# Patient Record
Sex: FEMALE | Race: WHITE | NOT HISPANIC OR LATINO
[De-identification: names, ages, dates, MRNs, and addresses within clinical notes are randomized per-mention and may not be internally consistent; named-entity substitution may affect disease eponyms.]

---

## 2018-07-05 ENCOUNTER — RECORD ABSTRACTING (OUTPATIENT)
Age: 53
End: 2018-07-05

## 2018-07-05 ENCOUNTER — APPOINTMENT (OUTPATIENT)
Dept: FAMILY MEDICINE | Facility: CLINIC | Age: 53
End: 2018-07-05
Payer: COMMERCIAL

## 2018-07-05 LAB
BILIRUB UR QL STRIP: NORMAL
GLUCOSE UR-MCNC: NORMAL
HCG UR QL: 0.2 EU/DL
HGB UR QL STRIP.AUTO: NORMAL
KETONES UR-MCNC: NORMAL
LEUKOCYTE ESTERASE UR QL STRIP: NORMAL
NITRITE UR QL STRIP: NORMAL
PH UR STRIP: 5
PROT UR STRIP-MCNC: NORMAL
SP GR UR STRIP: 1.01

## 2018-07-05 PROCEDURE — 99396 PREV VISIT EST AGE 40-64: CPT | Mod: 25

## 2018-07-05 PROCEDURE — 81003 URINALYSIS AUTO W/O SCOPE: CPT | Mod: QW

## 2018-07-05 PROCEDURE — 36415 COLL VENOUS BLD VENIPUNCTURE: CPT

## 2018-07-06 LAB
25(OH)D3 SERPL-MCNC: 32.8 NG/ML
ALBUMIN SERPL ELPH-MCNC: 4.6 G/DL
ALP BLD-CCNC: 53 U/L
ALT SERPL-CCNC: 13 U/L
ANION GAP SERPL CALC-SCNC: 16 MMOL/L
AST SERPL-CCNC: 18 U/L
BASOPHILS # BLD AUTO: 0.01 K/UL
BASOPHILS NFR BLD AUTO: 0.3 %
BILIRUB SERPL-MCNC: 0.3 MG/DL
BUN SERPL-MCNC: 16 MG/DL
CALCIUM SERPL-MCNC: 9.5 MG/DL
CANCER AG125 SERPL-ACNC: 7 U/ML
CANCER AG27-29 SERPL-ACNC: 23.2 U/ML
CHLORIDE SERPL-SCNC: 104 MMOL/L
CHOLEST SERPL-MCNC: 210 MG/DL
CHOLEST/HDLC SERPL: 3 RATIO
CO2 SERPL-SCNC: 25 MMOL/L
CREAT SERPL-MCNC: 0.85 MG/DL
EOSINOPHIL # BLD AUTO: 0.03 K/UL
EOSINOPHIL NFR BLD AUTO: 0.9 %
GLUCOSE SERPL-MCNC: 95 MG/DL
HCT VFR BLD CALC: 44.3 %
HDLC SERPL-MCNC: 70 MG/DL
HGB BLD-MCNC: 14.5 G/DL
IMM GRANULOCYTES NFR BLD AUTO: 0 %
LDLC SERPL CALC-MCNC: 105 MG/DL
LYMPHOCYTES # BLD AUTO: 1.68 K/UL
LYMPHOCYTES NFR BLD AUTO: 48.4 %
MAN DIFF?: NORMAL
MCHC RBC-ENTMCNC: 32.1 PG
MCHC RBC-ENTMCNC: 32.7 GM/DL
MCV RBC AUTO: 98 FL
MONOCYTES # BLD AUTO: 0.16 K/UL
MONOCYTES NFR BLD AUTO: 4.6 %
NEUTROPHILS # BLD AUTO: 1.59 K/UL
NEUTROPHILS NFR BLD AUTO: 45.8 %
PLATELET # BLD AUTO: 198 K/UL
POTASSIUM SERPL-SCNC: 4.3 MMOL/L
PROT SERPL-MCNC: 6.5 G/DL
RBC # BLD: 4.52 M/UL
RBC # FLD: 13.5 %
SODIUM SERPL-SCNC: 145 MMOL/L
TRIGL SERPL-MCNC: 177 MG/DL
WBC # FLD AUTO: 3.47 K/UL

## 2018-07-06 NOTE — HISTORY OF PRESENT ILLNESS
[FreeTextEntry1] : No complaints [de-identified] : Feel well. 10 year STANISLAV. Followed by Sukhdev

## 2018-07-06 NOTE — PHYSICAL EXAM

## 2018-11-03 ENCOUNTER — TRANSCRIPTION ENCOUNTER (OUTPATIENT)
Age: 53
End: 2018-11-03

## 2019-01-16 ENCOUNTER — TRANSCRIPTION ENCOUNTER (OUTPATIENT)
Age: 54
End: 2019-01-16

## 2019-09-24 ENCOUNTER — APPOINTMENT (OUTPATIENT)
Dept: FAMILY MEDICINE | Facility: CLINIC | Age: 54
End: 2019-09-24
Payer: COMMERCIAL

## 2019-09-24 VITALS
BODY MASS INDEX: 20.91 KG/M2 | DIASTOLIC BLOOD PRESSURE: 70 MMHG | HEIGHT: 65.5 IN | SYSTOLIC BLOOD PRESSURE: 108 MMHG | WEIGHT: 127 LBS

## 2019-09-24 DIAGNOSIS — N91.2 AMENORRHEA, UNSPECIFIED: ICD-10-CM

## 2019-09-24 PROCEDURE — 99213 OFFICE O/P EST LOW 20 MIN: CPT | Mod: 25

## 2019-09-24 NOTE — PHYSICAL EXAM
[Normal Sclera/Conjunctiva] : normal sclera/conjunctiva [Normal] : no acute distress, well nourished, well developed and well-appearing [No Respiratory Distress] : no respiratory distress  [Normal Outer Ear/Nose] : the outer ears and nose were normal in appearance [No Accessory Muscle Use] : no accessory muscle use [Vagina] : normal vaginal exam [Urethral Meatus] : the meatus of the urethra showed no abnormalities [External Female Genitalia] : normal external genitalia [Cervix] : normal cervix

## 2019-09-24 NOTE — HISTORY OF PRESENT ILLNESS
[FreeTextEntry1] : patient is here for a women's wellness exam  [de-identified] : Azucena is a 54-year old female with a h/o breast cancer s/p right mastectomy 12 years ago who presents for a GYN exam.\par

## 2019-10-01 LAB
CYTOLOGY CVX/VAG DOC THIN PREP: ABNORMAL
HEMOCCULT STL QL IA: NEGATIVE
HPV DNA HIGH RISK: NEGATIVE
HPV DNA LOW RISK: NEGATIVE

## 2019-10-21 ENCOUNTER — NON-APPOINTMENT (OUTPATIENT)
Age: 54
End: 2019-10-21

## 2019-10-21 ENCOUNTER — APPOINTMENT (OUTPATIENT)
Dept: FAMILY MEDICINE | Facility: CLINIC | Age: 54
End: 2019-10-21
Payer: COMMERCIAL

## 2019-10-21 VITALS
DIASTOLIC BLOOD PRESSURE: 84 MMHG | WEIGHT: 128 LBS | HEART RATE: 60 BPM | OXYGEN SATURATION: 98 % | SYSTOLIC BLOOD PRESSURE: 120 MMHG | TEMPERATURE: 98.3 F | HEIGHT: 65.5 IN | BODY MASS INDEX: 21.07 KG/M2

## 2019-10-21 LAB
BILIRUB UR QL STRIP: NEGATIVE
CLARITY UR: CLEAR
COLLECTION METHOD: NORMAL
GLUCOSE UR-MCNC: NEGATIVE
HCG UR QL: 0.2 EU/DL
HGB UR QL STRIP.AUTO: NEGATIVE
KETONES UR-MCNC: NEGATIVE
LEUKOCYTE ESTERASE UR QL STRIP: NORMAL
NITRITE UR QL STRIP: NEGATIVE
PH UR STRIP: 5.5
PROT UR STRIP-MCNC: NEGATIVE
SP GR UR STRIP: 1.02

## 2019-10-21 PROCEDURE — 81003 URINALYSIS AUTO W/O SCOPE: CPT | Mod: QW

## 2019-10-21 PROCEDURE — 99396 PREV VISIT EST AGE 40-64: CPT | Mod: 25

## 2019-10-21 PROCEDURE — 36415 COLL VENOUS BLD VENIPUNCTURE: CPT

## 2019-10-21 PROCEDURE — 93000 ELECTROCARDIOGRAM COMPLETE: CPT

## 2019-10-24 NOTE — HEALTH RISK ASSESSMENT
[Very Good] : ~his/her~  mood as very good [] : No [No] : In the past 12 months have you used drugs other than those required for medical reasons? No [No falls in past year] : Patient reported no falls in the past year [0] : 1) Little interest or pleasure doing things: Not at all (0) [[id number]] : 0 [Fully functional (bathing, dressing, toileting, transferring, walking, feeding)] : Fully functional (bathing, dressing, toileting, transferring, walking, feeding) [Change in mental status noted] : No change in mental status noted [Fully functional (using the telephone, shopping, preparing meals, housekeeping, doing laundry, using] : Fully functional and needs no help or supervision to perform IADLs (using the telephone, shopping, preparing meals, housekeeping, doing laundry, using transportation, managing medications and managing finances)

## 2019-10-24 NOTE — HISTORY OF PRESENT ILLNESS
[de-identified] : Feel well. 10 year STANISLAV. Followed by Sukhdev [FreeTextEntry1] : patient is here for a CPE

## 2019-10-24 NOTE — PHYSICAL EXAM
[No Acute Distress] : no acute distress [Well Developed] : well developed [Well Nourished] : well nourished [Well-Appearing] : well-appearing [Normal Sclera/Conjunctiva] : normal sclera/conjunctiva [PERRL] : pupils equal round and reactive to light [EOMI] : extraocular movements intact [Normal Outer Ear/Nose] : the outer ears and nose were normal in appearance [No JVD] : no jugular venous distention [Normal Oropharynx] : the oropharynx was normal [No Lymphadenopathy] : no lymphadenopathy [Supple] : supple [Thyroid Normal, No Nodules] : the thyroid was normal and there were no nodules present [No Respiratory Distress] : no respiratory distress  [No Accessory Muscle Use] : no accessory muscle use [Clear to Auscultation] : lungs were clear to auscultation bilaterally [Normal Rate] : normal rate  [Regular Rhythm] : with a regular rhythm [No Murmur] : no murmur heard [Normal S1, S2] : normal S1 and S2 [No Carotid Bruits] : no carotid bruits [No Abdominal Bruit] : a ~M bruit was not heard ~T in the abdomen [No Varicosities] : no varicosities [Pedal Pulses Present] : the pedal pulses are present [No Edema] : there was no peripheral edema [No Palpable Aorta] : no palpable aorta [No Extremity Clubbing/Cyanosis] : no extremity clubbing/cyanosis [Non-distended] : non-distended [Soft] : abdomen soft [Non Tender] : non-tender [No Masses] : no abdominal mass palpated [No HSM] : no HSM [Normal Bowel Sounds] : normal bowel sounds [Normal Posterior Cervical Nodes] : no posterior cervical lymphadenopathy [Normal Anterior Cervical Nodes] : no anterior cervical lymphadenopathy [No CVA Tenderness] : no CVA  tenderness [No Spinal Tenderness] : no spinal tenderness [No Joint Swelling] : no joint swelling [Grossly Normal Strength/Tone] : grossly normal strength/tone [No Rash] : no rash [Coordination Grossly Intact] : coordination grossly intact [No Focal Deficits] : no focal deficits [Deep Tendon Reflexes (DTR)] : deep tendon reflexes were 2+ and symmetric [Normal Gait] : normal gait [Normal Affect] : the affect was normal [Normal Insight/Judgement] : insight and judgment were intact

## 2019-11-08 LAB
ALBUMIN SERPL ELPH-MCNC: 4.7 G/DL
ALP BLD-CCNC: 56 U/L
ALT SERPL-CCNC: 10 U/L
ANION GAP SERPL CALC-SCNC: 14 MMOL/L
AST SERPL-CCNC: 15 U/L
BASOPHILS # BLD AUTO: 0.01 K/UL
BASOPHILS NFR BLD AUTO: 0.3 %
BILIRUB SERPL-MCNC: 0.3 MG/DL
BUN SERPL-MCNC: 12 MG/DL
CALCIUM SERPL-MCNC: 10.2 MG/DL
CHLORIDE SERPL-SCNC: 105 MMOL/L
CHOLEST SERPL-MCNC: 188 MG/DL
CHOLEST/HDLC SERPL: 2.9 RATIO
CO2 SERPL-SCNC: 27 MMOL/L
CREAT SERPL-MCNC: 0.76 MG/DL
EOSINOPHIL # BLD AUTO: 0.03 K/UL
EOSINOPHIL NFR BLD AUTO: 0.8 %
GLUCOSE SERPL-MCNC: 91 MG/DL
HCT VFR BLD CALC: 42.7 %
HDLC SERPL-MCNC: 66 MG/DL
HGB BLD-MCNC: 13.4 G/DL
IMM GRANULOCYTES NFR BLD AUTO: 0.3 %
LDLC SERPL CALC-MCNC: 85 MG/DL
LYMPHOCYTES # BLD AUTO: 1.76 K/UL
LYMPHOCYTES NFR BLD AUTO: 46.1 %
MAN DIFF?: NORMAL
MCHC RBC-ENTMCNC: 31.2 PG
MCHC RBC-ENTMCNC: 31.4 GM/DL
MCV RBC AUTO: 99.3 FL
MONOCYTES # BLD AUTO: 0.27 K/UL
MONOCYTES NFR BLD AUTO: 7.1 %
NEUTROPHILS # BLD AUTO: 1.74 K/UL
NEUTROPHILS NFR BLD AUTO: 45.4 %
PLATELET # BLD AUTO: 205 K/UL
POTASSIUM SERPL-SCNC: 5.2 MMOL/L
PROT SERPL-MCNC: 6.4 G/DL
RBC # BLD: 4.3 M/UL
RBC # FLD: 12.2 %
SODIUM SERPL-SCNC: 146 MMOL/L
TRIGL SERPL-MCNC: 186 MG/DL
WBC # FLD AUTO: 3.82 K/UL

## 2020-11-19 ENCOUNTER — APPOINTMENT (OUTPATIENT)
Dept: FAMILY MEDICINE | Facility: CLINIC | Age: 55
End: 2020-11-19
Payer: COMMERCIAL

## 2020-11-19 ENCOUNTER — NON-APPOINTMENT (OUTPATIENT)
Age: 55
End: 2020-11-19

## 2020-11-19 VITALS
HEIGHT: 65.5 IN | HEART RATE: 53 BPM | WEIGHT: 124 LBS | BODY MASS INDEX: 20.41 KG/M2 | DIASTOLIC BLOOD PRESSURE: 76 MMHG | TEMPERATURE: 97.1 F | SYSTOLIC BLOOD PRESSURE: 118 MMHG | OXYGEN SATURATION: 99 %

## 2020-11-19 LAB
BILIRUB UR QL STRIP: NORMAL
CLARITY UR: CLEAR
COLLECTION METHOD: NORMAL
GLUCOSE UR-MCNC: NORMAL
HCG UR QL: 0.2 EU/DL
HGB UR QL STRIP.AUTO: NORMAL
KETONES UR-MCNC: NORMAL
LEUKOCYTE ESTERASE UR QL STRIP: NORMAL
NITRITE UR QL STRIP: NORMAL
PH UR STRIP: 5.5
PROT UR STRIP-MCNC: NORMAL
SP GR UR STRIP: >=1.03

## 2020-11-19 PROCEDURE — 93000 ELECTROCARDIOGRAM COMPLETE: CPT

## 2020-11-19 PROCEDURE — 99396 PREV VISIT EST AGE 40-64: CPT | Mod: 25

## 2020-11-19 PROCEDURE — 36415 COLL VENOUS BLD VENIPUNCTURE: CPT

## 2020-11-19 PROCEDURE — 81003 URINALYSIS AUTO W/O SCOPE: CPT | Mod: QW

## 2020-11-19 NOTE — HISTORY OF PRESENT ILLNESS
[FreeTextEntry1] : none [de-identified] : Feel well. 10 year STANISLAV. Followed by Sukhdev\par Will be having one implant changed

## 2020-11-19 NOTE — HEALTH RISK ASSESSMENT
[Very Good] : ~his/her~  mood as very good [No] : In the past 12 months have you used drugs other than those required for medical reasons? No [No falls in past year] : Patient reported no falls in the past year [0] : 2) Feeling down, depressed, or hopeless: Not at all (0) [Fully functional (bathing, dressing, toileting, transferring, walking, feeding)] : Fully functional (bathing, dressing, toileting, transferring, walking, feeding) [Fully functional (using the telephone, shopping, preparing meals, housekeeping, doing laundry, using] : Fully functional and needs no help or supervision to perform IADLs (using the telephone, shopping, preparing meals, housekeeping, doing laundry, using transportation, managing medications and managing finances) [] : No

## 2021-01-12 LAB
25(OH)D3 SERPL-MCNC: 28.6 NG/ML
ALBUMIN SERPL ELPH-MCNC: 4.8 G/DL
ALP BLD-CCNC: 53 U/L
ALT SERPL-CCNC: 15 U/L
ANION GAP SERPL CALC-SCNC: 11 MMOL/L
AST SERPL-CCNC: 17 U/L
BASOPHILS # BLD AUTO: 0.02 K/UL
BASOPHILS NFR BLD AUTO: 0.7 %
BILIRUB SERPL-MCNC: 0.5 MG/DL
BUN SERPL-MCNC: 13 MG/DL
CALCIUM SERPL-MCNC: 9.7 MG/DL
CHLORIDE SERPL-SCNC: 104 MMOL/L
CHOLEST SERPL-MCNC: 206 MG/DL
CO2 SERPL-SCNC: 29 MMOL/L
CREAT SERPL-MCNC: 0.87 MG/DL
EOSINOPHIL # BLD AUTO: 0.02 K/UL
EOSINOPHIL NFR BLD AUTO: 0.7 %
ESTIMATED AVERAGE GLUCOSE: 94 MG/DL
GLUCOSE SERPL-MCNC: 80 MG/DL
HBA1C MFR BLD HPLC: 4.9 %
HCT VFR BLD CALC: 46.6 %
HDLC SERPL-MCNC: 69 MG/DL
HGB BLD-MCNC: 14.7 G/DL
IMM GRANULOCYTES NFR BLD AUTO: 0 %
LDLC SERPL CALC-MCNC: 117 MG/DL
LYMPHOCYTES # BLD AUTO: 1.34 K/UL
LYMPHOCYTES NFR BLD AUTO: 45.1 %
MAN DIFF?: NORMAL
MCHC RBC-ENTMCNC: 31.5 GM/DL
MCHC RBC-ENTMCNC: 32 PG
MCV RBC AUTO: 101.5 FL
MONOCYTES # BLD AUTO: 0.15 K/UL
MONOCYTES NFR BLD AUTO: 5.1 %
NEUTROPHILS # BLD AUTO: 1.44 K/UL
NEUTROPHILS NFR BLD AUTO: 48.4 %
NONHDLC SERPL-MCNC: 137 MG/DL
PLATELET # BLD AUTO: 186 K/UL
POTASSIUM SERPL-SCNC: 4.2 MMOL/L
PROT SERPL-MCNC: 6.3 G/DL
RBC # BLD: 4.59 M/UL
RBC # FLD: 12.3 %
SODIUM SERPL-SCNC: 143 MMOL/L
TRIGL SERPL-MCNC: 98 MG/DL
WBC # FLD AUTO: 2.97 K/UL

## 2021-05-11 ENCOUNTER — APPOINTMENT (OUTPATIENT)
Dept: FAMILY MEDICINE | Facility: CLINIC | Age: 56
End: 2021-05-11
Payer: COMMERCIAL

## 2021-05-11 VITALS
HEIGHT: 65.5 IN | DIASTOLIC BLOOD PRESSURE: 72 MMHG | SYSTOLIC BLOOD PRESSURE: 120 MMHG | WEIGHT: 125 LBS | BODY MASS INDEX: 20.58 KG/M2 | OXYGEN SATURATION: 99 % | HEART RATE: 49 BPM

## 2021-05-11 DIAGNOSIS — L81.9 DISORDER OF PIGMENTATION, UNSPECIFIED: ICD-10-CM

## 2021-05-11 DIAGNOSIS — R21 RASH AND OTHER NONSPECIFIC SKIN ERUPTION: ICD-10-CM

## 2021-05-11 PROCEDURE — 99213 OFFICE O/P EST LOW 20 MIN: CPT

## 2021-05-11 PROCEDURE — 99072 ADDL SUPL MATRL&STAF TM PHE: CPT

## 2021-05-13 PROBLEM — L81.9 HYPERPIGMENTATION OF SKIN: Status: ACTIVE | Noted: 2021-05-13

## 2021-05-13 NOTE — HISTORY OF PRESENT ILLNESS
[FreeTextEntry1] : patient found a spot on her back, for about a month [de-identified] : as per cc

## 2021-05-13 NOTE — PHYSICAL EXAM
[Normal] : normal rate, regular rhythm, normal S1 and S2 and no murmur heard [de-identified] : 4x5 cm area of light hyperpigmentation

## 2021-09-30 ENCOUNTER — TRANSCRIPTION ENCOUNTER (OUTPATIENT)
Age: 56
End: 2021-09-30

## 2021-11-29 ENCOUNTER — APPOINTMENT (OUTPATIENT)
Dept: OBGYN | Facility: CLINIC | Age: 56
End: 2021-11-29

## 2021-12-20 ENCOUNTER — TRANSCRIPTION ENCOUNTER (OUTPATIENT)
Age: 56
End: 2021-12-20

## 2021-12-20 ENCOUNTER — APPOINTMENT (OUTPATIENT)
Dept: OBGYN | Facility: CLINIC | Age: 56
End: 2021-12-20
Payer: COMMERCIAL

## 2021-12-20 DIAGNOSIS — Z98.891 HISTORY OF UTERINE SCAR FROM PREVIOUS SURGERY: ICD-10-CM

## 2021-12-20 DIAGNOSIS — Z87.891 PERSONAL HISTORY OF NICOTINE DEPENDENCE: ICD-10-CM

## 2021-12-20 PROCEDURE — 99203 OFFICE O/P NEW LOW 30 MIN: CPT | Mod: 95

## 2021-12-20 NOTE — HISTORY OF PRESENT ILLNESS
[Patient reported PAP Smear was normal] : Patient reported PAP Smear was normal [Mammogramdate] : 2006 [TextBox_19] : MASTECTOMIES BILATERAL [PapSmeardate] : 2019 [TextBox_37] : NOT ON CHART [Yes] : Patient has concerns regarding sex [Currently Active] : currently active [Men] : men [No] : No [FreeTextEntry1] : VAGINAL DRYNESS, DISCUSSED OTC MANAGEMENT OF DRYNESS [FreeTextEntry2] :

## 2021-12-20 NOTE — PLAN
[FreeTextEntry1] : EX-SMOKER, FOR LOW DOSE CT CHEST, LUNG CANCER SCREENING.\par \par LAST PAP 2019, FOR F/U THIS YEAR.  \par \par CONSIDER COVID19  VACCINE, IN LIGHT OF PRIOR ILLNESS, NO VACCINATION.\par \par Discussed genitourinary syndrome of menopause include urinary and  vaginal symptoms of infection, inflammation, dyspareunia, incontinence and bleeding. Evaluation including cultures and possible sonogram broached.   Therapies including dietary intervention, antibiotics, local oil, estrogen or DHEA application, physical therapy and urogynecology referrals discussed.\par OTC NOGC-CARE DISCUSSED.

## 2022-01-10 ENCOUNTER — NON-APPOINTMENT (OUTPATIENT)
Age: 57
End: 2022-01-10

## 2022-01-10 ENCOUNTER — APPOINTMENT (OUTPATIENT)
Dept: FAMILY MEDICINE | Facility: CLINIC | Age: 57
End: 2022-01-10
Payer: COMMERCIAL

## 2022-01-10 VITALS
HEART RATE: 50 BPM | BODY MASS INDEX: 19.59 KG/M2 | DIASTOLIC BLOOD PRESSURE: 70 MMHG | TEMPERATURE: 96 F | SYSTOLIC BLOOD PRESSURE: 106 MMHG | WEIGHT: 119 LBS | OXYGEN SATURATION: 97 % | HEIGHT: 65.5 IN

## 2022-01-10 DIAGNOSIS — Z11.59 ENCOUNTER FOR SCREENING FOR OTHER VIRAL DISEASES: ICD-10-CM

## 2022-01-10 PROCEDURE — 81003 URINALYSIS AUTO W/O SCOPE: CPT | Mod: QW

## 2022-01-10 PROCEDURE — 99396 PREV VISIT EST AGE 40-64: CPT | Mod: 25

## 2022-01-10 PROCEDURE — 36415 COLL VENOUS BLD VENIPUNCTURE: CPT

## 2022-01-10 PROCEDURE — 93000 ELECTROCARDIOGRAM COMPLETE: CPT

## 2022-01-10 NOTE — HEALTH RISK ASSESSMENT
[Very Good] : ~his/her~  mood as very good [Never] : Never [No] : In the past 12 months have you used drugs other than those required for medical reasons? No [No falls in past year] : Patient reported no falls in the past year [0] : 2) Feeling down, depressed, or hopeless: Not at all (0) [PHQ-2 Negative - No further assessment needed] : PHQ-2 Negative - No further assessment needed [[id number]] : 0 [Change in mental status noted] : No change in mental status noted [Fully functional (bathing, dressing, toileting, transferring, walking, feeding)] : Fully functional (bathing, dressing, toileting, transferring, walking, feeding) [Fully functional (using the telephone, shopping, preparing meals, housekeeping, doing laundry, using] : Fully functional and needs no help or supervision to perform IADLs (using the telephone, shopping, preparing meals, housekeeping, doing laundry, using transportation, managing medications and managing finances)

## 2022-02-07 LAB
25(OH)D3 SERPL-MCNC: 28.6 NG/ML
ALBUMIN SERPL ELPH-MCNC: 5.1 G/DL
ALP BLD-CCNC: 53 U/L
ALT SERPL-CCNC: 15 U/L
ANION GAP SERPL CALC-SCNC: 13 MMOL/L
AST SERPL-CCNC: 19 U/L
BASOPHILS # BLD AUTO: 0.03 K/UL
BASOPHILS NFR BLD AUTO: 0.7 %
BILIRUB SERPL-MCNC: 0.5 MG/DL
BUN SERPL-MCNC: 15 MG/DL
CALCIUM SERPL-MCNC: 10 MG/DL
CHLORIDE SERPL-SCNC: 101 MMOL/L
CHOLEST SERPL-MCNC: 243 MG/DL
CO2 SERPL-SCNC: 28 MMOL/L
COVID-19 NUCLEOCAPSID  GAM ANTIBODY INTERPRETATION: NEGATIVE
CREAT SERPL-MCNC: 0.77 MG/DL
EOSINOPHIL # BLD AUTO: 0.03 K/UL
EOSINOPHIL NFR BLD AUTO: 0.7 %
ESTIMATED AVERAGE GLUCOSE: 97 MG/DL
GLUCOSE SERPL-MCNC: 83 MG/DL
HBA1C MFR BLD HPLC: 5 %
HCT VFR BLD CALC: 47.1 %
HDLC SERPL-MCNC: 88 MG/DL
HGB BLD-MCNC: 14.9 G/DL
IMM GRANULOCYTES NFR BLD AUTO: 0 %
LDLC SERPL CALC-MCNC: 128 MG/DL
LYMPHOCYTES # BLD AUTO: 2.01 K/UL
LYMPHOCYTES NFR BLD AUTO: 48 %
MAN DIFF?: NORMAL
MCHC RBC-ENTMCNC: 31.2 PG
MCHC RBC-ENTMCNC: 31.6 GM/DL
MCV RBC AUTO: 98.7 FL
MONOCYTES # BLD AUTO: 0.25 K/UL
MONOCYTES NFR BLD AUTO: 6 %
NEUTROPHILS # BLD AUTO: 1.87 K/UL
NEUTROPHILS NFR BLD AUTO: 44.6 %
NONHDLC SERPL-MCNC: 154 MG/DL
PLATELET # BLD AUTO: 207 K/UL
POTASSIUM SERPL-SCNC: 4.7 MMOL/L
PROT SERPL-MCNC: 6.9 G/DL
RBC # BLD: 4.77 M/UL
RBC # FLD: 12.1 %
SARS-COV-2 AB SERPL QL IA: 0.07 INDEX
SODIUM SERPL-SCNC: 141 MMOL/L
TRIGL SERPL-MCNC: 133 MG/DL
WBC # FLD AUTO: 4.19 K/UL

## 2022-02-19 LAB — HEMOCCULT STL QL IA: NEGATIVE

## 2022-06-16 ENCOUNTER — APPOINTMENT (OUTPATIENT)
Dept: OBGYN | Facility: CLINIC | Age: 57
End: 2022-06-16
Payer: COMMERCIAL

## 2022-06-16 VITALS
HEIGHT: 65.5 IN | BODY MASS INDEX: 19.59 KG/M2 | WEIGHT: 119 LBS | DIASTOLIC BLOOD PRESSURE: 64 MMHG | SYSTOLIC BLOOD PRESSURE: 106 MMHG

## 2022-06-16 DIAGNOSIS — Z12.4 ENCOUNTER FOR SCREENING FOR MALIGNANT NEOPLASM OF CERVIX: ICD-10-CM

## 2022-06-16 DIAGNOSIS — N95.2 POSTMENOPAUSAL ATROPHIC VAGINITIS: ICD-10-CM

## 2022-06-16 PROCEDURE — 99214 OFFICE O/P EST MOD 30 MIN: CPT

## 2022-06-16 RX ORDER — ESTRADIOL 0.1 MG/G
0.1 CREAM VAGINAL
Qty: 1 | Refills: 3 | Status: ACTIVE | COMMUNITY
Start: 2022-06-16 | End: 1900-01-01

## 2022-06-16 RX ORDER — ESTRADIOL 2 MG/1
2 RING VAGINAL
Qty: 1 | Refills: 3 | Status: ACTIVE | COMMUNITY
Start: 2022-06-16 | End: 1900-01-01

## 2022-06-16 NOTE — HISTORY OF PRESENT ILLNESS
[Mammogramdate] : 11/2020, LEFT [TextBox_19] : UNSURE IF DONE, USUALLY GOES TO [facility] [PapSmeardate] : 9/2019 [TextBox_31] : HPV NEGATIVE [BoneDensityDate] : NOT YET [ColonoscopyDate] : [facility]OGUARD [facility] 2 [Yes] : Patient has concerns regarding sex [Currently Active] : currently active [Men] : men [No] : No [FreeTextEntry1] : VAGINAL DRYNESS [FreeTextEntry2] :

## 2022-06-16 NOTE — PHYSICAL EXAM
[Appropriately responsive] : appropriately responsive [Alert] : alert [No Acute Distress] : no acute distress [Soft] : soft [Non-tender] : non-tender [Non-distended] : non-distended [No HSM] : No HSM [No Lesions] : no lesions [No Mass] : no mass [Oriented x3] : oriented x3 [FreeTextEntry6] : RIGHT BREAST S/P MASTECTOMY WITH IMPLANT, LEFT WELL HEALED REDUCTION [No Discharge] : no discharge [No Masses] : no breast masses were palpable [Vulvar Atrophy] : vulvar atrophy [Labia Majora] : normal [Labia Minora] : normal [Normal] : normal [Uterine Adnexae] : normal [No Tenderness] : no tenderness [Nl Sphincter Tone] : normal sphincter tone [FreeTextEntry9] : GUAIAC NEGATIVE

## 2022-06-16 NOTE — PLAN
[FreeTextEntry1] : GUSM, DIFFICULTY COMPLYING WITH LONG TERM SUPPOSITORY USE.  DISCUSSED RISKS AND BENEFITS OF TOPICAL E2 TO VULVA WITH LOW DOSE ESTRING TO FOLLOW.  \par \par BREAST SCREENING LEFT, ROR [facility] MAMMOGRAM 2021, RX FOR CURRENT YEAR.\par \par MENOPAUSAL, DEXA TO EVALUATE OSTEOPENIA.

## 2022-06-20 LAB
CYTOLOGY CVX/VAG DOC THIN PREP: ABNORMAL
HPV HIGH+LOW RISK DNA PNL CVX: NOT DETECTED

## 2022-07-13 ENCOUNTER — NON-APPOINTMENT (OUTPATIENT)
Age: 57
End: 2022-07-13

## 2022-10-06 ENCOUNTER — APPOINTMENT (OUTPATIENT)
Dept: OBGYN | Facility: CLINIC | Age: 57
End: 2022-10-06

## 2022-10-13 ENCOUNTER — APPOINTMENT (OUTPATIENT)
Dept: OBGYN | Facility: CLINIC | Age: 57
End: 2022-10-13

## 2022-10-13 VITALS
DIASTOLIC BLOOD PRESSURE: 68 MMHG | WEIGHT: 118 LBS | HEIGHT: 65.5 IN | SYSTOLIC BLOOD PRESSURE: 112 MMHG | BODY MASS INDEX: 19.42 KG/M2

## 2022-10-13 PROCEDURE — 99214 OFFICE O/P EST MOD 30 MIN: CPT

## 2022-10-13 NOTE — PLAN
[FreeTextEntry1] : OSTEOPOROSIS.  F/U VITAMIN D3 AND EXERCISE. Resistance bands with links to resistance band exercises given to patient to promote bone health  ([facility]).\par  HX OF BREAST CANCER, CONSIDER EVISTA.

## 2022-10-13 NOTE — HISTORY OF PRESENT ILLNESS
[FreeTextEntry1] : Results of bone density reviewed with patient.  We discussed diet, exercise, calcium, vitamin D, smoking cessation, family history.  Fall precautions given. Discussed anti-resorptive agents, hormones and hormone agonists and expectant management.\par NOT TAKING VITAMIN D REGULARLY,FOR F/U AFTER 2 MONTHS.\par Resistance bands with links to resistance band exercises given to patient to promote bone health  ([facility]).\par \par OSTOEPOROSIS AT SPINE, OSTEOPENIA AT HIP. [TextBox_4] : Bone density results [Mammogramdate] : 7/29/22 [TextBox_19] : br 2 [BreastSonogramDate] : 7/29/22 [TextBox_25] : br 1  left breast  [PapSmeardate] : 6/12//22 [TextBox_31] : atrophic

## 2022-11-22 ENCOUNTER — APPOINTMENT (OUTPATIENT)
Dept: OBGYN | Facility: CLINIC | Age: 57
End: 2022-11-22

## 2023-02-23 ENCOUNTER — APPOINTMENT (OUTPATIENT)
Dept: FAMILY MEDICINE | Facility: CLINIC | Age: 58
End: 2023-02-23
Payer: COMMERCIAL

## 2023-02-23 ENCOUNTER — NON-APPOINTMENT (OUTPATIENT)
Age: 58
End: 2023-02-23

## 2023-02-23 VITALS
SYSTOLIC BLOOD PRESSURE: 116 MMHG | BODY MASS INDEX: 20.25 KG/M2 | HEART RATE: 55 BPM | TEMPERATURE: 96.5 F | HEIGHT: 65.5 IN | OXYGEN SATURATION: 98 % | DIASTOLIC BLOOD PRESSURE: 70 MMHG | WEIGHT: 123 LBS

## 2023-02-23 DIAGNOSIS — M25.541 PAIN IN JOINTS OF RIGHT HAND: ICD-10-CM

## 2023-02-23 DIAGNOSIS — M25.542 PAIN IN JOINTS OF RIGHT HAND: ICD-10-CM

## 2023-02-23 DIAGNOSIS — Z87.891 PERSONAL HISTORY OF NICOTINE DEPENDENCE: ICD-10-CM

## 2023-02-23 DIAGNOSIS — Z12.11 ENCOUNTER FOR SCREENING FOR MALIGNANT NEOPLASM OF COLON: ICD-10-CM

## 2023-02-23 LAB
BILIRUB UR QL STRIP: NEGATIVE
GLUCOSE UR-MCNC: NEGATIVE
HCG UR QL: 0.2 EU/DL
HGB UR QL STRIP.AUTO: NEGATIVE
KETONES UR-MCNC: NEGATIVE
LEUKOCYTE ESTERASE UR QL STRIP: NEGATIVE
NITRITE UR QL STRIP: NEGATIVE
PH UR STRIP: 5
PROT UR STRIP-MCNC: NEGATIVE
SP GR UR STRIP: 1.02

## 2023-02-23 PROCEDURE — 99396 PREV VISIT EST AGE 40-64: CPT | Mod: 25

## 2023-02-23 PROCEDURE — 36415 COLL VENOUS BLD VENIPUNCTURE: CPT

## 2023-02-23 PROCEDURE — 81003 URINALYSIS AUTO W/O SCOPE: CPT | Mod: QW

## 2023-02-23 PROCEDURE — 93000 ELECTROCARDIOGRAM COMPLETE: CPT

## 2023-02-23 NOTE — HEALTH RISK ASSESSMENT
[Very Good] : ~his/her~  mood as very good [No] : In the past 12 months have you used drugs other than those required for medical reasons? No [No falls in past year] : Patient reported no falls in the past year [0] : 2) Feeling down, depressed, or hopeless: Not at all (0) [PHQ-2 Negative - No further assessment needed] : PHQ-2 Negative - No further assessment needed [[id number]] : 0 [Never] : Never

## 2023-02-24 LAB — 25(OH)D3 SERPL-MCNC: 35.2 NG/ML

## 2023-03-24 LAB
ALBUMIN SERPL ELPH-MCNC: 4.7 G/DL
ALP BLD-CCNC: 48 U/L
ALT SERPL-CCNC: 11 U/L
ANION GAP SERPL CALC-SCNC: 11 MMOL/L
AST SERPL-CCNC: 21 U/L
BASOPHILS # BLD AUTO: 0.03 K/UL
BASOPHILS NFR BLD AUTO: 0.7 %
BILIRUB SERPL-MCNC: 0.5 MG/DL
BUN SERPL-MCNC: 16 MG/DL
CALCIUM SERPL-MCNC: 10.2 MG/DL
CHLORIDE SERPL-SCNC: 103 MMOL/L
CHOLEST SERPL-MCNC: 213 MG/DL
CO2 SERPL-SCNC: 28 MMOL/L
CREAT SERPL-MCNC: 0.87 MG/DL
EGFR: 77 ML/MIN/1.73M2
EOSINOPHIL # BLD AUTO: 0.02 K/UL
EOSINOPHIL NFR BLD AUTO: 0.5 %
ESTIMATED AVERAGE GLUCOSE: 100 MG/DL
GLUCOSE SERPL-MCNC: 96 MG/DL
HBA1C MFR BLD HPLC: 5.1 %
HCT VFR BLD CALC: 45.1 %
HDLC SERPL-MCNC: 72 MG/DL
HGB BLD-MCNC: 14.1 G/DL
IMM GRANULOCYTES NFR BLD AUTO: 0 %
LDLC SERPL CALC-MCNC: 124 MG/DL
LYMPHOCYTES # BLD AUTO: 1.43 K/UL
LYMPHOCYTES NFR BLD AUTO: 33.2 %
MAN DIFF?: NORMAL
MCHC RBC-ENTMCNC: 30.5 PG
MCHC RBC-ENTMCNC: 31.3 GM/DL
MCV RBC AUTO: 97.4 FL
MONOCYTES # BLD AUTO: 0.2 K/UL
MONOCYTES NFR BLD AUTO: 4.6 %
NEUTROPHILS # BLD AUTO: 2.63 K/UL
NEUTROPHILS NFR BLD AUTO: 61 %
NONHDLC SERPL-MCNC: 141 MG/DL
PLATELET # BLD AUTO: 196 K/UL
POTASSIUM SERPL-SCNC: 4.4 MMOL/L
PROT SERPL-MCNC: 6.6 G/DL
RBC # BLD: 4.63 M/UL
RBC # FLD: 12.4 %
SODIUM SERPL-SCNC: 142 MMOL/L
TRIGL SERPL-MCNC: 85 MG/DL
WBC # FLD AUTO: 4.31 K/UL

## 2023-05-16 ENCOUNTER — OUTPATIENT (OUTPATIENT)
Dept: OUTPATIENT SERVICES | Facility: HOSPITAL | Age: 58
LOS: 1 days | End: 2023-05-16
Payer: COMMERCIAL

## 2023-05-16 ENCOUNTER — APPOINTMENT (OUTPATIENT)
Dept: RADIOLOGY | Facility: CLINIC | Age: 58
End: 2023-05-16
Payer: COMMERCIAL

## 2023-05-16 DIAGNOSIS — Z87.891 PERSONAL HISTORY OF NICOTINE DEPENDENCE: ICD-10-CM

## 2023-05-16 DIAGNOSIS — M25.541 PAIN IN JOINTS OF RIGHT HAND: ICD-10-CM

## 2023-05-16 PROCEDURE — 71046 X-RAY EXAM CHEST 2 VIEWS: CPT | Mod: 26

## 2023-05-16 PROCEDURE — 71046 X-RAY EXAM CHEST 2 VIEWS: CPT

## 2023-05-16 PROCEDURE — 73120 X-RAY EXAM OF HAND: CPT | Mod: 26,RT

## 2023-05-16 PROCEDURE — 73120 X-RAY EXAM OF HAND: CPT

## 2023-05-22 ENCOUNTER — NON-APPOINTMENT (OUTPATIENT)
Age: 58
End: 2023-05-22

## 2023-06-22 ENCOUNTER — NON-APPOINTMENT (OUTPATIENT)
Age: 58
End: 2023-06-22

## 2023-06-22 ENCOUNTER — APPOINTMENT (OUTPATIENT)
Dept: OBGYN | Facility: CLINIC | Age: 58
End: 2023-06-22
Payer: COMMERCIAL

## 2023-06-22 VITALS
WEIGHT: 123 LBS | SYSTOLIC BLOOD PRESSURE: 118 MMHG | HEIGHT: 66 IN | BODY MASS INDEX: 19.77 KG/M2 | DIASTOLIC BLOOD PRESSURE: 72 MMHG

## 2023-06-22 DIAGNOSIS — N95.8 OTHER SPECIFIED MENOPAUSAL AND PERIMENOPAUSAL DISORDERS: ICD-10-CM

## 2023-06-22 DIAGNOSIS — M81.0 AGE-RELATED OSTEOPOROSIS W/OUT CURRENT PATHOLOGICAL FRACTURE: ICD-10-CM

## 2023-06-22 DIAGNOSIS — Z12.39 ENCOUNTER FOR OTHER SCREENING FOR MALIGNANT NEOPLASM OF BREAST: ICD-10-CM

## 2023-06-22 DIAGNOSIS — Z01.419 ENCOUNTER FOR GYNECOLOGICAL EXAMINATION (GENERAL) (ROUTINE) W/OUT ABNORMAL FINDINGS: ICD-10-CM

## 2023-06-22 PROCEDURE — 99396 PREV VISIT EST AGE 40-64: CPT

## 2023-06-22 RX ORDER — CHOLECALCIFEROL (VITAMIN D3) 1250 MCG
1.25 MG CAPSULE ORAL
Qty: 12 | Refills: 3 | Status: ACTIVE | COMMUNITY
Start: 2023-06-22 | End: 1900-01-01

## 2023-06-22 NOTE — PHYSICAL EXAM
[Appropriately responsive] : appropriately responsive [Alert] : alert [No Acute Distress] : no acute distress [Soft] : soft [Non-tender] : non-tender [Non-distended] : non-distended [No HSM] : No HSM [No Lesions] : no lesions [No Mass] : no mass [Oriented x3] : oriented x3 [FreeTextEntry6] : ABSENT RIGHT BREAST, S/P MASTECTOMY, IMPLANT, RIGHT REDUCTION [Normal] : normal [No Discharge] : no discharge [] : an implant [___] : a [unfilled] ~Ucm mastectomy scar [No Masses] : no breast masses were palpable

## 2023-06-22 NOTE — HISTORY OF PRESENT ILLNESS
[Currently Active] : currently active [No] : No [TextBox_4] : ANNUAL [Mammogramdate] : 7/29/22 [TextBox_19] : BR 2 [TextBox_25] : BR 1 [PapSmeardate] : 6/16/22 [TextBox_31] : ATROPHIC [BoneDensityDate] : 7/29/22 [TextBox_37] : B0TH  PENIA AND POROSIS  [ColonoscopyDate] : COLOGUARD [LMPDate] : 2006 [TextBox_6] : 2006 [FreeTextEntry1] : VAGINAL DRYNESS, OTC LUBRICANTS [FreeTextEntry2] :

## 2024-02-21 ENCOUNTER — NON-APPOINTMENT (OUTPATIENT)
Age: 59
End: 2024-02-21

## 2024-02-26 ENCOUNTER — APPOINTMENT (OUTPATIENT)
Dept: FAMILY MEDICINE | Facility: CLINIC | Age: 59
End: 2024-02-26
Payer: COMMERCIAL

## 2024-02-26 ENCOUNTER — NON-APPOINTMENT (OUTPATIENT)
Age: 59
End: 2024-02-26

## 2024-02-26 VITALS
DIASTOLIC BLOOD PRESSURE: 70 MMHG | WEIGHT: 126 LBS | HEART RATE: 64 BPM | SYSTOLIC BLOOD PRESSURE: 120 MMHG | BODY MASS INDEX: 20.34 KG/M2 | OXYGEN SATURATION: 99 % | TEMPERATURE: 97.3 F

## 2024-02-26 DIAGNOSIS — E55.9 VITAMIN D DEFICIENCY, UNSPECIFIED: ICD-10-CM

## 2024-02-26 DIAGNOSIS — Z78.0 OTHER SPECIFIED DISORDERS OF BONE DENSITY AND STRUCTURE, UNSPECIFIED SITE: ICD-10-CM

## 2024-02-26 DIAGNOSIS — M85.80 OTHER SPECIFIED DISORDERS OF BONE DENSITY AND STRUCTURE, UNSPECIFIED SITE: ICD-10-CM

## 2024-02-26 DIAGNOSIS — Z85.3 PERSONAL HISTORY OF MALIGNANT NEOPLASM OF BREAST: ICD-10-CM

## 2024-02-26 DIAGNOSIS — R92.30 DENSE BREASTS, UNSPECIFIED: ICD-10-CM

## 2024-02-26 DIAGNOSIS — Z00.00 ENCOUNTER FOR GENERAL ADULT MEDICAL EXAMINATION W/OUT ABNORMAL FINDINGS: ICD-10-CM

## 2024-02-26 LAB
BILIRUB UR QL STRIP: NEGATIVE
GLUCOSE UR-MCNC: NEGATIVE
HCG UR QL: 0.2 EU/DL
HGB UR QL STRIP.AUTO: NORMAL
KETONES UR-MCNC: NEGATIVE
LEUKOCYTE ESTERASE UR QL STRIP: NEGATIVE
NITRITE UR QL STRIP: NEGATIVE
PH UR STRIP: 5.5
PROT UR STRIP-MCNC: NEGATIVE
SP GR UR STRIP: 1.02

## 2024-02-26 PROCEDURE — 99396 PREV VISIT EST AGE 40-64: CPT

## 2024-02-26 PROCEDURE — 93000 ELECTROCARDIOGRAM COMPLETE: CPT

## 2024-02-26 PROCEDURE — 81003 URINALYSIS AUTO W/O SCOPE: CPT | Mod: QW

## 2024-02-26 PROCEDURE — 36415 COLL VENOUS BLD VENIPUNCTURE: CPT

## 2024-02-26 NOTE — PHYSICAL EXAM
[Well Nourished] : well nourished [No Acute Distress] : no acute distress [Well Developed] : well developed [Well-Appearing] : well-appearing [Normal Sclera/Conjunctiva] : normal sclera/conjunctiva [PERRL] : pupils equal round and reactive to light [EOMI] : extraocular movements intact [Normal Outer Ear/Nose] : the outer ears and nose were normal in appearance [Normal Oropharynx] : the oropharynx was normal [No JVD] : no jugular venous distention [No Lymphadenopathy] : no lymphadenopathy [Supple] : supple [Thyroid Normal, No Nodules] : the thyroid was normal and there were no nodules present [No Respiratory Distress] : no respiratory distress  [No Accessory Muscle Use] : no accessory muscle use [Clear to Auscultation] : lungs were clear to auscultation bilaterally [Normal Rate] : normal rate  [Normal S1, S2] : normal S1 and S2 [Regular Rhythm] : with a regular rhythm [No Murmur] : no murmur heard [No Carotid Bruits] : no carotid bruits [No Abdominal Bruit] : a ~M bruit was not heard ~T in the abdomen [No Varicosities] : no varicosities [Pedal Pulses Present] : the pedal pulses are present [No Edema] : there was no peripheral edema [No Palpable Aorta] : no palpable aorta [No Extremity Clubbing/Cyanosis] : no extremity clubbing/cyanosis [Soft] : abdomen soft [Non Tender] : non-tender [Non-distended] : non-distended [No Masses] : no abdominal mass palpated [No HSM] : no HSM [Normal Posterior Cervical Nodes] : no posterior cervical lymphadenopathy [Normal Bowel Sounds] : normal bowel sounds [Normal Anterior Cervical Nodes] : no anterior cervical lymphadenopathy [No CVA Tenderness] : no CVA  tenderness [No Spinal Tenderness] : no spinal tenderness [Grossly Normal Strength/Tone] : grossly normal strength/tone [No Joint Swelling] : no joint swelling [No Rash] : no rash [Coordination Grossly Intact] : coordination grossly intact [No Focal Deficits] : no focal deficits [Normal Gait] : normal gait [Deep Tendon Reflexes (DTR)] : deep tendon reflexes were 2+ and symmetric [Normal Affect] : the affect was normal [Normal Insight/Judgement] : insight and judgment were intact

## 2024-02-26 NOTE — HEALTH RISK ASSESSMENT
[Very Good] : ~his/her~  mood as very good [No] : In the past 12 months have you used drugs other than those required for medical reasons? No [No falls in past year] : Patient reported no falls in the past year [Little interest or pleasure doing things] : 1) Little interest or pleasure doing things [Feeling down, depressed, or hopeless] : 2) Feeling down, depressed, or hopeless [0] : 2) Feeling down, depressed, or hopeless: Not at all (0) [PHQ-2 Negative - No further assessment needed] : PHQ-2 Negative - No further assessment needed [Employed] : employed [] :  [Never] : Never [[id number]] : 0 [Change in mental status noted] : No change in mental status noted

## 2024-03-18 LAB
25(OH)D3 SERPL-MCNC: 49.9 NG/ML
ALBUMIN SERPL ELPH-MCNC: 4.8 G/DL
ALP BLD-CCNC: 57 U/L
ALT SERPL-CCNC: 28 U/L
ANION GAP SERPL CALC-SCNC: 14 MMOL/L
AST SERPL-CCNC: 22 U/L
BASOPHILS # BLD AUTO: 0.02 K/UL
BASOPHILS NFR BLD AUTO: 0.5 %
BILIRUB SERPL-MCNC: 0.4 MG/DL
BUN SERPL-MCNC: 14 MG/DL
CALCIUM SERPL-MCNC: 10.7 MG/DL
CHLORIDE SERPL-SCNC: 103 MMOL/L
CHOLEST SERPL-MCNC: 222 MG/DL
CO2 SERPL-SCNC: 27 MMOL/L
CREAT SERPL-MCNC: 0.87 MG/DL
EGFR: 77 ML/MIN/1.73M2
EOSINOPHIL # BLD AUTO: 0.02 K/UL
EOSINOPHIL NFR BLD AUTO: 0.5 %
ESTIMATED AVERAGE GLUCOSE: 97 MG/DL
GLUCOSE SERPL-MCNC: 98 MG/DL
HBA1C MFR BLD HPLC: 5 %
HCT VFR BLD CALC: 46.7 %
HDLC SERPL-MCNC: 62 MG/DL
HGB BLD-MCNC: 14.5 G/DL
IMM GRANULOCYTES NFR BLD AUTO: 0 %
LDLC SERPL CALC-MCNC: 139 MG/DL
LYMPHOCYTES # BLD AUTO: 1.69 K/UL
LYMPHOCYTES NFR BLD AUTO: 43.1 %
MAN DIFF?: NORMAL
MCHC RBC-ENTMCNC: 30.3 PG
MCHC RBC-ENTMCNC: 31 GM/DL
MCV RBC AUTO: 97.7 FL
MONOCYTES # BLD AUTO: 0.2 K/UL
MONOCYTES NFR BLD AUTO: 5.1 %
NEUTROPHILS # BLD AUTO: 1.99 K/UL
NEUTROPHILS NFR BLD AUTO: 50.8 %
NONHDLC SERPL-MCNC: 160 MG/DL
PLATELET # BLD AUTO: 217 K/UL
POTASSIUM SERPL-SCNC: 5.6 MMOL/L
PROT SERPL-MCNC: 6.9 G/DL
RBC # BLD: 4.78 M/UL
RBC # FLD: 12.5 %
SODIUM SERPL-SCNC: 145 MMOL/L
TRIGL SERPL-MCNC: 121 MG/DL
WBC # FLD AUTO: 3.92 K/UL

## 2024-04-05 ENCOUNTER — NON-APPOINTMENT (OUTPATIENT)
Age: 59
End: 2024-04-05

## 2024-07-11 ENCOUNTER — APPOINTMENT (OUTPATIENT)
Dept: OBGYN | Facility: CLINIC | Age: 59
End: 2024-07-11

## 2025-01-22 ENCOUNTER — NON-APPOINTMENT (OUTPATIENT)
Age: 60
End: 2025-01-22

## 2025-08-17 ENCOUNTER — NON-APPOINTMENT (OUTPATIENT)
Age: 60
End: 2025-08-17